# Patient Record
Sex: FEMALE | Race: WHITE
[De-identification: names, ages, dates, MRNs, and addresses within clinical notes are randomized per-mention and may not be internally consistent; named-entity substitution may affect disease eponyms.]

---

## 2023-09-11 ENCOUNTER — HOSPITAL ENCOUNTER (OUTPATIENT)
Dept: HOSPITAL 75 - SDC | Age: 58
End: 2023-09-11
Attending: NURSE PRACTITIONER
Payer: COMMERCIAL

## 2023-09-11 DIAGNOSIS — E11.9: Primary | ICD-10-CM

## 2023-09-11 PROCEDURE — 83036 HEMOGLOBIN GLYCOSYLATED A1C: CPT

## 2023-09-11 PROCEDURE — 36415 COLL VENOUS BLD VENIPUNCTURE: CPT

## 2023-10-18 ENCOUNTER — HOSPITAL ENCOUNTER (OUTPATIENT)
Dept: HOSPITAL 75 - PREOP | Age: 58
Discharge: HOME | End: 2023-10-18
Attending: SURGERY
Payer: COMMERCIAL

## 2023-10-18 VITALS — HEIGHT: 64.02 IN | BODY MASS INDEX: 26.87 KG/M2 | WEIGHT: 157.41 LBS

## 2023-10-18 DIAGNOSIS — Z01.818: Primary | ICD-10-CM

## 2023-10-25 ENCOUNTER — HOSPITAL ENCOUNTER (OUTPATIENT)
Dept: HOSPITAL 75 - ENDO | Age: 58
Discharge: HOME | End: 2023-10-25
Attending: SURGERY
Payer: COMMERCIAL

## 2023-10-25 VITALS — HEIGHT: 64.02 IN | WEIGHT: 157.41 LBS | BODY MASS INDEX: 26.87 KG/M2

## 2023-10-25 VITALS — SYSTOLIC BLOOD PRESSURE: 156 MMHG | DIASTOLIC BLOOD PRESSURE: 88 MMHG

## 2023-10-25 VITALS — SYSTOLIC BLOOD PRESSURE: 146 MMHG | DIASTOLIC BLOOD PRESSURE: 65 MMHG

## 2023-10-25 DIAGNOSIS — Z86.010: ICD-10-CM

## 2023-10-25 DIAGNOSIS — Z12.11: Primary | ICD-10-CM

## 2023-10-25 DIAGNOSIS — K64.4: ICD-10-CM

## 2023-10-25 DIAGNOSIS — K64.1: ICD-10-CM

## 2023-10-25 NOTE — PROGRESS NOTE-PRE OPERATIVE
Pre-Operative Progress Note


Date of Available H&P:  Oct 25, 2023


Date H&P Reviewed:  Oct 25, 2023


Time H&P Reviewed:  14:00


History & Physical:  No changes noted


Pre-Operative Diagnosis:  screening colo











SHIVA MOCTEZUMA MD                Oct 25, 2023 14:11

## 2023-10-25 NOTE — DISCHARGE INST-SURGICAL
D/C Lap Instructions-RHIANNA


Follow Up 





Activity as tolerated








High Fiber Diet 25g or more per day





Avoid Alcohol, Caffeine, Spicy Greasy and Acid foods.





Drink 64 fluid oz or more of fluids per day.





Symptoms to Report: Fever over 101 degree F, Nausea/Vomiting 


If any problems/questions: Contact your physician or go to Emergency Room











SHIVA MOCTEZUMA MD                Oct 25, 2023 14:12

## 2023-10-25 NOTE — ANESTHESIA-GENERAL POST-OP
MAC


Patient Condition


Mental Status/LOC:  Same as Preop


Cardiovascular:  Satisfactory


Nausea/Vomiting:  Absent


Respiratory:  Satisfactory


Pain:  Controlled


Complications:  Absent





Post Op Complications


Complications


None





Follow Up Care/Instructions


Patient Instructions


None needed.





Anesthesiology Discharge Order


Discharge Order


Patient is doing well, no complaints, stable vital signs, no apparent adverse 

anesthesia problems.   


No complications reported per nursing.











ELIEZER RATLIFF CRNA         Oct 25, 2023 17:34

## 2023-10-26 NOTE — OPERATIVE REPORT
DATE OF SERVICE: 10/25/2023



PREOPERATIVE DIAGNOSIS:

Screening colonoscopy.



POSTOPERATIVE DIAGNOSES:

Mild chronic stage II external and internal hemorrhoids, normal colorectal 

anastomosis.  No polyps or any neoplasms identified.



PROCEDURE:

Colonoscopy.



SURGEON:

Shiva Moctezuma MD



ANESTHESIA:

Monitored anesthesia care.



ESTIMATED BLOOD LOSS:

Minimal.



FINDINGS:

Mild chronic stage II external and internal hemorrhoids, normal colorectal 

anastomosis.  No polyps or any neoplasms identified.



DISPOSITION:

The patient tolerated the procedure well.



INDICATIONS:

The patient is a 57-year-old female in need of a screening colonoscopy.  Her 

last one was approximately 5 years ago and she reports that a polyp was 

identified, biopsied and found to be benign.  She does not report any red blood 

per rectum, nor any dark tarry stools.  She also does not report any family 

history of colon cancer.  She did have an issue with what sounds to be colonic 

inertia and due to the resultant severe constipation in high school, she did 

undergo what sounds to be a left-sided colon resection and anastomosis.



DESCRIPTION OF PROCEDURE:

The patient was brought to the endoscopy suite and laid in the left lateral 

decubitus position.  After adequate IV pain and sedative medications and 

monitored anesthesia care, a digital rectal examination was performed.  Mild 

chronic stage II external and internal hemorrhoids were identified, not actively

edematous nor inflamed and no bleeding.  Normal sphincter tone was felt and 

there were no palpable masses.



The endoscope was then intubated into the anus, rectum gently insufflated.  The 

endoscope was then advanced through the valves of Andrade of the rectum with no 

polyps or any neoplasms identified.  At the colorectal anastomosis, this was 

widely patent with no lesions surrounding the region.  The endoscope was then 

advanced through the descending, transverse and ascending colon to the cecum, 

which were normal.  There were no polyps or any neoplasms identified throughout 

the colon or rectum.  The endoscope was then slowly withdrawn while taking a 

second look and suctioning of residual air with no additional findings.



The patient tolerated the procedure well.  We will recommend a high-fiber diet 

with a fiber supplement, which are equal or exceed 25 grams daily as well as 

significant amounts of water to promote soft consistency stools on a daily 

basis.  If she is asymptomatic, it appears that she does not have any other 

previous history of any aggressive polyps as well as no first-degree family 

history of colon cancer and because of this, she does not need another 

colonoscopy for another 10 years.





Job ID: [id number]

DocumentID: [id number]

Dictated Date: 10/25/2023 15:22:37

Transcription Date: 10/25/2023 23:37:00

Dictated By: SHIVA MOCTEZUMA MD